# Patient Record
Sex: MALE
[De-identification: names, ages, dates, MRNs, and addresses within clinical notes are randomized per-mention and may not be internally consistent; named-entity substitution may affect disease eponyms.]

---

## 2021-11-15 ENCOUNTER — DASHBOARD ENCOUNTERS (OUTPATIENT)
Age: 21
End: 2021-11-15

## 2021-11-15 ENCOUNTER — OFFICE VISIT (OUTPATIENT)
Dept: URBAN - METROPOLITAN AREA CLINIC 109 | Facility: CLINIC | Age: 21
End: 2021-11-15
Payer: COMMERCIAL

## 2021-11-15 DIAGNOSIS — R10.13 EPIGASTRIC PAIN: ICD-10-CM

## 2021-11-15 DIAGNOSIS — E66.01 MORBID OBESITY: ICD-10-CM

## 2021-11-15 DIAGNOSIS — K21.9 GASTROESOPHAGEAL REFLUX DISEASE, UNSPECIFIED WHETHER ESOPHAGITIS PRESENT: ICD-10-CM

## 2021-11-15 DIAGNOSIS — K76.0 FATTY LIVER: ICD-10-CM

## 2021-11-15 PROBLEM — 238136002: Status: ACTIVE | Noted: 2021-11-15

## 2021-11-15 PROBLEM — 235856003 LIVER DISEASE: Status: ACTIVE | Noted: 2021-11-15

## 2021-11-15 PROBLEM — 235595009 GASTROESOPHAGEAL REFLUX DISEASE: Status: ACTIVE | Noted: 2021-11-15

## 2021-11-15 PROBLEM — 235595009: Status: ACTIVE | Noted: 2021-11-15

## 2021-11-15 PROBLEM — 197321007: Status: ACTIVE | Noted: 2021-11-15

## 2021-11-15 PROCEDURE — 99203 OFFICE O/P NEW LOW 30 MIN: CPT | Performed by: INTERNAL MEDICINE

## 2021-11-15 NOTE — HPI-TODAY'S VISIT:
The patient is referred for abdominal pain. Pain is described as a burning epigastric pain occuring 2-3 times a week lasting hours. Sx have been present for about a years.  He has heartburn at times, typically mild. No dysphagia, weight loss, hematemesis or melena. He has been taking omeprazole 40mg daily which is helping, but sx are still present.  He was also told of liver disease- fatty liver since the age of 13.  He has had elevated LFTs in the past.  His labs were last done about 3 weeks ago and he had an u/s 6 months ago.  No GB stones were found, only the fatty liver.  Told to lose weight in the past, which he is working on gradually, about 25 pounds in a years.  He is not diabetic, but was told lipids were mildly abnormal.